# Patient Record
Sex: FEMALE | URBAN - METROPOLITAN AREA
[De-identification: names, ages, dates, MRNs, and addresses within clinical notes are randomized per-mention and may not be internally consistent; named-entity substitution may affect disease eponyms.]

---

## 2023-11-14 ENCOUNTER — APPOINTMENT (RX ONLY)
Dept: URBAN - METROPOLITAN AREA CLINIC 164 | Facility: CLINIC | Age: 53
Setting detail: DERMATOLOGY
End: 2023-11-14

## 2023-11-14 DIAGNOSIS — Z41.9 ENCOUNTER FOR PROCEDURE FOR PURPOSES OTHER THAN REMEDYING HEALTH STATE, UNSPECIFIED: ICD-10-CM

## 2023-11-14 PROCEDURE — ? ADDITIONAL NOTES

## 2023-11-14 PROCEDURE — ? PRODUCT LINE (ZO SKIN HEALTH)

## 2023-11-14 PROCEDURE — ? VI PEEL

## 2023-11-14 NOTE — PROCEDURE: VI PEEL
Price (Use Numbers Only, No Special Characters Or $): 190
Treatment Number: 1
Prep: The treated area was degreased with pre-peel cleanser, and vaseline was applied for protection of mucous membranes.
Post-Care Instructions: I reviewed with the patient in detail post-care instructions. Patient should avoid sun exposure and wear sun protection.
Detail Level: Zone
Consent: Prior to the procedure, written consent was obtained and risks were reviewed, including but not limited to: redness, peeling, blistering, pigmentary change, scarring, infection, and pain. Patient is aware multiple treatments may be necessary to achieve the desired outcome.
Chemical Peel: VI Peel Advanced
Post Peel Care: After the procedure, the patient was instructed not to wash the treated area for 6-8 hours or manually remove dead skin when the peeling process starts. Patient may use OTC hydrocortisone cream for itching.  Patient instructed to use the provided Retin-A wipes on the treated area on the 1st and 2nd nights.

## 2023-11-14 NOTE — PROCEDURE: ADDITIONAL NOTES
Render Risk Assessment In Note?: no
Additional Notes: Patient came in for a VI Peel (Advanced). Patient was pleased with treatment & is scheduled for a 4 week post op on 12.12.23 at 10am. I recommended ZO Hydrating Cleanser & Daily Power Defense.\\n\\nPatient ,eft with post skin care & instructions.
Detail Level: Zone

## 2023-11-14 NOTE — PROCEDURE: PRODUCT LINE (ZO SKIN HEALTH)
Product 20 Units: 0
Detail Level: Zone
Name Of Product 6: Hydrating Cleanser
Product 11 Application Directions: Apply AM & PM 7x a week.
Product 17 Application Directions: Apply AM & reapply if prolong in the sun, every 2 hours.
Product 14 Application Directions: Scrub in the PM 3-5x a week.
Name Of Product 3: Calming Toner
Risk Of Complication Category: No MDM
Product 23 Price (In Dollars - Numeric Only, No Special Characters Or $): 130
Product 3 Price (In Dollars - Numeric Only, No Special Characters Or $): 40
Allow Plan To Count Towards E/M Coding: Yes
Product 31 Price (In Dollars - Numeric Only, No Special Characters Or $): 0.00
Product 6 Price (In Dollars - Numeric Only, No Special Characters Or $): 47
Name Of Product 9: Daily Power Defense
Name Of Product 26: Hydrating Creme
Product 23 Application Directions: Apply to upper & lower lids after cleansing.
Name Of Product 18: ZO Skin Brightener 0.5 Retinol
Product 9 Price (In Dollars - Numeric Only, No Special Characters Or $): 160
Name Of Product 12: Acne Control
Name Of Product 15: Wrinkle + Texture Repair
Product 15 Price (In Dollars - Numeric Only, No Special Characters Or $): 154
Product 18 Price (In Dollars - Numeric Only, No Special Characters Or $): 115
Name Of Product 21: Retinol Skin Brightener 0.25
Product 26 Price (In Dollars - Numeric Only, No Special Characters Or $): 120
Product 12 Price (In Dollars - Numeric Only, No Special Characters Or $): 36
Product 6 Units: 1
Product 21 Price (In Dollars - Numeric Only, No Special Characters Or $): 100
Name Of Product 24: ZO BrightAlive Skin Brightener
Product 3 Application Directions: Apply AM & PM 7x a week after cleansing or exfoliating (on days you exfoliate).
Product 6 Application Directions: Use AM & PM 7x a week
Name Of Product 4: Renewal Creme
Name Of Product 7: Recovery Creme
Product 12 Application Directions: Cleanse & Apply to cystic flare up 1-3x a day.
Product 26 Application Directions: Apply after cleaning and toning
Name Of Product 27: Exfoliation Accelerator
Name Of Product 10: Exfoliating Cleanser
Product 18 Application Directions: Apply 2x a week at night for week 0-2.\\nApply 3x a week at night for week 2-4.\\nApply 4x a week at night for week 4-6.
Product 15 Application Directions: Apply 2x a week at night (0-2 weeks)\\nApply 3x a week at night (2-4 weeks)\\nApply 4x a week at night (4-6 weeks)
Name Of Product 16: Skin Brightening Program
Name Of Product 19: Exfoliating Polish (travel)
Name Of Product 13: Rozatrol
Name Of Product 22: Retinol Skin Brightener 0.1 Retinol
Product 27 Price (In Dollars - Numeric Only, No Special Characters Or $): 72
Product 7 Application Directions: Apply in the PM, include the neck. Can be applied in the AM if dry.
Product 1 Application Directions: Cleanse AM & PM 7x a week.
Name Of Product 1: Gentle Cleanser
Product 13 Price (In Dollars - Numeric Only, No Special Characters Or $): 91
Product 19 Price (In Dollars - Numeric Only, No Special Characters Or $): 21
Product 16 Price (In Dollars - Numeric Only, No Special Characters Or $): 193
Product 27 Application Directions: Apply AM, 7x after cleansing.
Name Of Product 2: Exfoliating Polish
Product 24 Application Directions: Apply AM & PM after cleansing & toner.
Name Of Product 8: Growth Factor Serum
Product 16 Application Directions: Written directions were handed to patient.
Name Of Product 25: Brightening Eye Creme
Product 13 Application Directions: Apply AM & PM before moisturizer.
Name Of Product 5: Complexion Renewal Pads
Product 5 Price (In Dollars - Numeric Only, No Special Characters Or $): 53
Name Of Product 17: Sunscreen + Primer SPF 30
Name Of Product 11: Oil Control Pads
Product 22 Application Directions: Written instructions were handed to the patient.
Product 2 Price (In Dollars - Numeric Only, No Special Characters Or $): 68
Product 19 Application Directions: Exfoliate 2-3x a week.
Name Of Product 28: Illuminating AOX Serum
Product 28 Price (In Dollars - Numeric Only, No Special Characters Or $): 165
Name Of Product 20: Instant Pore Refiner
Name Of Product 14: Dual Action Scrub
Product 8 Price (In Dollars - Numeric Only, No Special Characters Or $): 155
Product 17 Price (In Dollars - Numeric Only, No Special Characters Or $): 67
Product 11 Price (In Dollars - Numeric Only, No Special Characters Or $): 64
Name Of Product 23: Grown Factor Eye Serum
Product 2 Application Directions: Use AM or PM 2-3x a week.
Product 14 Price (In Dollars - Numeric Only, No Special Characters Or $): 80
Product 28 Application Directions: Apply AM before SPF.
Product 25 Application Directions: Apply AM & PM on the lower lids only, after cleansing.
Product 5 Application Directions: Apply after cleansing in the AM & PM 7x a week.
Product 20 Price (In Dollars - Numeric Only, No Special Characters Or $): 62
Assigning Risk Information: Per AMA, level of risk is based upon consequences of the problem(s) addressed at the encounter when appropriately treated. Risk also includes medical decision making related to the need to initiate or forego further testing, treatment and/or hospitalization. Over the counter medication are assigned a risk level of low. Prescription medication management is assigned a risk level of moderate.

## 2025-01-20 ENCOUNTER — APPOINTMENT (OUTPATIENT)
Dept: URBAN - METROPOLITAN AREA CLINIC 164 | Facility: CLINIC | Age: 55
Setting detail: DERMATOLOGY
End: 2025-01-20

## 2025-01-20 DIAGNOSIS — Z41.9 ENCOUNTER FOR PROCEDURE FOR PURPOSES OTHER THAN REMEDYING HEALTH STATE, UNSPECIFIED: ICD-10-CM

## 2025-01-20 PROCEDURE — ? VI PEEL

## 2025-01-20 NOTE — PROCEDURE: VI PEEL
Consent: Prior to the procedure, written consent was obtained and risks were reviewed, including but not limited to: redness, peeling, blistering, pigmentary change, scarring, infection, and pain. Patient is aware multiple treatments may be necessary to achieve the desired outcome.
Treatment Number: 1
Post Peel Care: After the procedure, the patient was instructed not to wash the treated area for 6-8 hours or manually remove dead skin when the peeling process starts. Patient may use OTC hydrocortisone cream for itching.  Patient instructed to use the provided Retin-A wipes on the treated area on the 1st and 2nd nights.
Chemical Peel: VI Peel
Prep: The treated area was degreased with pre-peel cleanser, and vaseline was applied for protection of mucous membranes.
Detail Level: Detailed
Price (Use Numbers Only, No Special Characters Or $): 575